# Patient Record
(demographics unavailable — no encounter records)

---

## 2024-11-05 NOTE — DEVELOPMENTAL MILESTONES
[Goes to the bathroom and has] : goes to bathroom and has bowel movement by self [Dresses and undresses without] : dresses and undresses without much help [Plays make-believe] : plays make-believe [Uses 4-word sentences] : uses 4-word sentences [Uses words that are 100%] : uses words that are 100% intelligible to strangers [Climbs stairs, alternating feet] : climbs stairs, alternating feet without support [Skips on one foot] : skips on one foot [Draws a person with head and] : draws a person with head and 3 body part [Grasps a pencil with thumb and] : grasps a pencil with thumb and fingers instead of fist [Draws recognizable pictures] : draws recognizable pictures [Normal Development] : Normal Development [Tells a story from a book] : does not tell a story from a book [Draws a simple cross] : does not draw a simple cross [Unbuttons medium-sized buttons] : does not unbutton medium-sized buttons [FreeTextEntry1] : recieving OT and ST  doing better

## 2024-11-05 NOTE — HISTORY OF PRESENT ILLNESS
[Mother] : mother [whole ___ oz/d] : consumes [unfilled] oz of whole cow's milk per day [Fruit] : fruit [Vegetables] : vegetables [Meat] : meat [Grains] : grains [Eggs] : eggs [Fish] : fish [Dairy] : dairy [Vitamin] : Patient takes vitamin daily [___ stools per day] : [unfilled]  stools per day [Normal] : Normal [In own bed] : In own bed [Brushing teeth] : Brushing teeth [Yes] : Patient goes to dentist yearly [Toothpaste] : Primary Fluoride Source: Toothpaste [In Pre-K] : In Pre-K [Curiosity about body] : Curiosity about body [Playtime (60 min/d)] : Playtime 60 min a day [< 2 hrs of screen time] : Less than 2 hrs of screen time [Appropiate parent-child communication] : Appropriate parent-child communication [Child given choices] : Child given choices [Child Cooperates] : Child cooperates [Parent has appropriate responses to behavior] : Parent has appropriate responses to behavior [No] : Not at  exposure [Water heater temperature set at <120 degrees F] : Water heater temperature set at <120 degrees F [Carbon Monoxide Detectors] : Carbon monoxide detectors [Smoke Detectors] : Smoke detectors [Supervised outdoor play] : Supervised outdoor play [NO] : No [Exposure to electronic nicotine delivery system] : No exposure to electronic nicotine delivery system [Up to date] : Up to date

## 2024-11-05 NOTE — PHYSICAL EXAM

## 2024-11-22 NOTE — HISTORY OF PRESENT ILLNESS
[de-identified] : coughing and congestion [FreeTextEntry6] : slight increase in cough when sleeping no fever coughing more when waking up in am no vomiting no distress

## 2025-01-29 NOTE — HISTORY OF PRESENT ILLNESS
[de-identified] : Fever, coughing, chest pain [FreeTextEntry6] : Mother states pt has had a fever t max 102, coughing with chest pain since last night. Mother has administered Motrin

## 2025-01-29 NOTE — HISTORY OF PRESENT ILLNESS
[de-identified] : Fever, coughing, chest pain [FreeTextEntry6] : Mother states pt has had a fever t max 102, coughing with chest pain since last night. Mother has administered Motrin

## 2025-02-01 NOTE — HISTORY OF PRESENT ILLNESS
[EENT/Resp Symptoms] : EENT/RESPIRATORY SYMPTOMS [Fever] : fever [Nasal Congestion] : nasal congestion [Cough] : cough [Decreased Appetite] : decreased appetite [FreeTextEntry6] : difficulty walking , started this morning [Headache] : no headache [Ear Pain] : no ear pain [Sore Throat] : no sore throat [Vomiting] : no vomiting [Diarrhea] : no diarrhea [Decreased Urine Output] : no decreased urine output [FreeTextEntry5] : leg pains- limping, urine is normal [de-identified] : antibiotic, pred, albuterol, no allergies  flu diagnosed on Wednesday, has not had fever for 24 hours and seemed to have improved as of yesterday and then this morning was complaining of significant bilateral leg pain and limping

## 2025-02-01 NOTE — PHYSICAL EXAM
[Warm, Well Perfused x4] : warm, well perfused x4 [NL] : warm, clear [de-identified] : no tenderness to palpation of the legs, no erythema or edema, limps/walks on tiptoes when asked to walk but is able to ambulate

## 2025-02-01 NOTE — DISCUSSION/SUMMARY
[FreeTextEntry1] :   - Suspected Myositis after flu         Plan :           -  Therapeutic Interventions : Motrin for pain relief.           -  Diagnostic Tests : No additional tests required at this time.           -  Referrals : Potential referral to an emergency department if the condition worsens or does not improve in next few hours with motrin and increased intake of fluids. Discussed patient should also present to ER if there is a change in urine output including darkened urine.           -  Patient Education : Informed patient and the mother about the suspected myositis, a common post-flu symptom, and explained the importance of drinking lots of water and staying hydrated to flush out inflammation. Also, recommended Motrin medication as a part of treatment plan           -  Follow-Up : Decided to follow up over the phone with the patient in the afternoon to reassess his condition. If the condition worsens, the family will bring the patient to the emergency department

## 2025-02-01 NOTE — HISTORY OF PRESENT ILLNESS
[EENT/Resp Symptoms] : EENT/RESPIRATORY SYMPTOMS [Fever] : fever [Nasal Congestion] : nasal congestion [Cough] : cough [Decreased Appetite] : decreased appetite [FreeTextEntry6] : difficulty walking , started this morning [Headache] : no headache [Ear Pain] : no ear pain [Sore Throat] : no sore throat [Diarrhea] : no diarrhea [Vomiting] : no vomiting [Decreased Urine Output] : no decreased urine output [FreeTextEntry5] : leg pains- limping, urine is normal [de-identified] : antibiotic, pred, albuterol, no allergies  flu diagnosed on Wednesday, has not had fever for 24 hours and seemed to have improved as of yesterday and then this morning was complaining of significant bilateral leg pain and limping

## 2025-02-01 NOTE — PHYSICAL EXAM
[Warm, Well Perfused x4] : warm, well perfused x4 [NL] : warm, clear [de-identified] : no tenderness to palpation of the legs, no erythema or edema, limps/walks on tiptoes when asked to walk but is able to ambulate

## 2025-02-01 NOTE — ADDENDUM
[FreeTextEntry1] : Called patient at 1:20 PM to check in on symptoms, patient still limping some but playful, now taking a nap. Counseled to check symptoms after nap, if not continuing to improve or if worsening to go to ER. If improving counseled on continued hydration, motrin use.

## 2025-02-01 NOTE — REVIEW OF SYSTEMS
[Cough] : cough [Congestion] : congestion [Changes in Gait] : changes in gait [Myalgia] : myalgia [Fever] : no fever [Headache] : no headache [Edema] : no edema [Ear Pain] : no ear pain [Tachypnea] : not tachypneic [Vomiting] : no vomiting [Diarrhea] : no diarrhea [Swelling of Joint] : no swelling of joint [Redness of Joint] : no redness of joint [Rash] : no rash [Polyuria] : no polyuria

## 2025-02-04 NOTE — HISTORY OF PRESENT ILLNESS
[EENT/Resp Symptoms] : EENT/RESPIRATORY SYMPTOMS [Cough] : cough [___ Day(s)] : [unfilled] day(s) [de-identified] : follow up for myositis state sthat has been doing well no distress no issues

## 2025-02-04 NOTE — HISTORY OF PRESENT ILLNESS
[EENT/Resp Symptoms] : EENT/RESPIRATORY SYMPTOMS [Cough] : cough [___ Day(s)] : [unfilled] day(s) [de-identified] : follow up for myositis state sthat has been doing well no distress no issues

## 2025-05-13 NOTE — BEGINNING OF VISIT
1.) Do you have an Advance directive, living will, or power of  for health care document that contains your wishes for end of life care?:  No     6 a.) How many servings of Fruits and Vegetables do you have each day ( 1 serving = 1 piece of fruit, 1/2 cup fruits or vegetables):  1 per day     6 b.) How many servings of High Fiber / Whole Grain Foods to you have each day ( 1 serving = 1 cup cold cereal, 1/2 cup cooked cereal, 1 slice bread):  1 per day     6 c.) How many servings of Fried or High Fat Foods do you have each day (1 serving = 1 Whyte, French Fries, chips, doughnut, fried chicken/fish):  2 per day     6 d.) How many servings of Sugar Sweetened Beverages do you have each day ( 1 serving = 1 can or 12 oz cup of sode or juice):  2 per day          [Mother] : mother

## 2025-05-15 NOTE — DISCUSSION/SUMMARY
[FreeTextEntry1] :   - Upper Respiratory Infection with possible Seasonal Allergies: The patient presents with symptoms consistent with an upper respiratory infection, including cough with phlegm and nasal congestion. The presence of circles under the eyes suggests a possible allergic component.     - Therapeutic Interventions: Prescribe Zyrtec 2.5mg daily at night for 10 days to address potential allergies.     - Albuterol nebulizer treatments 2-3 times daily, can be mixed with saline.     - Saline nebulizer treatments as needed.     - Diagnostic Tests: No additional tests required at this time.     - Patient Education: Informed parents about the possibility of the cough lasting for more than a week.     - Follow-Up: Return if symptoms worsen, particularly if fever develops, or if there's no improvement in 2 days.

## 2025-05-15 NOTE — HISTORY OF PRESENT ILLNESS
[EENT/Resp Symptoms] : EENT/RESPIRATORY SYMPTOMS [Nasal congestion] : nasal congestion [Nasal Congestion] : nasal congestion [Cough] : cough [Fever] : no fever [Headache] : no headache [Ear Pain] : no ear pain [Sore Throat] : no sore throat [Decreased Appetite] : no decreased appetite [Vomiting] : no vomiting [Diarrhea] : no diarrhea [Decreased Urine Output] : no decreased urine output [de-identified] : no meds, no allergies

## 2025-05-15 NOTE — HISTORY OF PRESENT ILLNESS
[EENT/Resp Symptoms] : EENT/RESPIRATORY SYMPTOMS [Nasal congestion] : nasal congestion [Nasal Congestion] : nasal congestion [Cough] : cough [Fever] : no fever [Headache] : no headache [Ear Pain] : no ear pain [Sore Throat] : no sore throat [Decreased Appetite] : no decreased appetite [Vomiting] : no vomiting [Diarrhea] : no diarrhea [Decreased Urine Output] : no decreased urine output [de-identified] : no meds, no allergies

## 2025-07-05 NOTE — DISCUSSION/SUMMARY
[FreeTextEntry1] : use controller medications if coughing increase rescue medication and titrate treatments as needed if worsens or distress rescue medications to be given maximum three times in 30 minutes if not improvement to go to ER and steroid on standby  discussed in detail all travel guidance when to use antibiotics use of ear drops  fever control how to treat allergic reactions and what to look out for summer tips dehydration and to follow hydration status 
VOMITING/NAUSEA/DIARRHEA

## 2025-07-05 NOTE — HISTORY OF PRESENT ILLNESS
[FreeTextEntry6] : traveling in several days has history of chronic OM and also bronchospasm doing well otherwise no distress no other issues doing well otherwise